# Patient Record
Sex: FEMALE | Race: WHITE
[De-identification: names, ages, dates, MRNs, and addresses within clinical notes are randomized per-mention and may not be internally consistent; named-entity substitution may affect disease eponyms.]

---

## 2018-06-09 ENCOUNTER — HOSPITAL ENCOUNTER (EMERGENCY)
Dept: HOSPITAL 76 - ED | Age: 48
Discharge: HOME | End: 2018-06-09
Payer: MEDICAID

## 2018-06-09 VITALS — SYSTOLIC BLOOD PRESSURE: 162 MMHG | DIASTOLIC BLOOD PRESSURE: 89 MMHG

## 2018-06-09 DIAGNOSIS — B02.9: Primary | ICD-10-CM

## 2018-06-09 PROCEDURE — 96372 THER/PROPH/DIAG INJ SC/IM: CPT

## 2018-06-09 PROCEDURE — 99283 EMERGENCY DEPT VISIT LOW MDM: CPT

## 2018-06-09 NOTE — ED PHYSICIAN DOCUMENTATION
PD HPI SKIN





- Stated complaint


Stated Complaint: RASH/UPPER RT SIDE PX





- Chief complaint


Chief Complaint: General





- History obtained from


History obtained from: Patient





- History of Present Illness


Timing - onset: How many days ago (2-3)


Timing - duration: Days (initially with some aching in right chest 2-3 days ago 

without obvious injury or reason. Started with some itching in right chest 

yesterday then rash last evening, increasing into today. Very painful. Feeling 

of general malaise as well.)


Timing - details: Gradual onset, Still present


Location: Chest (right chest in band at just below the breast level.)


Quality / character: Itchy, Painful, Discolored (red), Vesicular


Associated symptoms: Myalgias.  No: Fever


Contributing factors: No: Exposed to medication, Exposed to food, Insect bite /

sting


Similar symptoms before: Has not had sx before


Recently seen: Not recently seen





Review of Systems


Constitutional: reports: Chills, Myalgias, Fatigue.  denies: Fever


Nose: denies: Rhinorrhea / runny nose, Congestion


Throat: denies: Sore throat


Respiratory: denies: Cough


GI: denies: Abdominal Pain, Nausea, Vomiting, Diarrhea


: denies: Dysuria, Frequency


Skin: reports: Rash, Lesions


Neurologic: denies: Focal weakness, Numbness, Headache





PD PAST MEDICAL HISTORY





- Past Medical History


Past Medical History: No


Cardiovascular: None


Respiratory: None


Neuro: None


Endocrine/Autoimmune: None


GI: None


GYN: None


: None


HEENT: None


Psych: None


Musculoskeletal: None


Derm: None





- Past Surgical History


Past Surgical History: Yes





- Present Medications


Home Medications: 


 Ambulatory Orders











 Medication  Instructions  Recorded  Confirmed


 


Acyclovir 800 mg PO 5XD #35 tablet 06/09/18 


 


Amitriptyline [Elavil] 25 mg PO HS #30 tablet 06/09/18 


 


Dexamethasone [Decadron] 4 mg PO DAILY #5 tablet 06/09/18 


 


Oxycodone HCl/Acetaminophen 1 each PO Q6H PRN #25 tablet 06/09/18 





[Percocet 5-325 mg Tablet]   














- Allergies


Allergies/Adverse Reactions: 


 Allergies











Allergy/AdvReac Type Severity Reaction Status Date / Time


 


iron AdvReac  Anaphylaxis Verified 06/09/18 07:04














- Social History


Does the pt smoke?: No


Smoking Status: Never smoker


Does the pt drink ETOH?: Yes


Does the pt have substance abuse?: No





- Immunizations


Immunizations are current?: Yes





- POLST


Patient has POLST: No





PD ED PE NORMAL





- Vitals


Vital signs reviewed: Yes





- General


General: Alert and oriented X 3, No acute distress, Well developed/nourished





- Neck


Neck: Supple, no meningeal sign, No adenopathy





- Respiratory


Respiratory: No respiratory distress





- Derm


Derm: Warm and dry, Other (right chest with band of blistering red patches just 

below breast level to front sternum and around to the back, c/w shingles. )





- Extremities


Extremities: No deformity, No tenderness to palpate





- Neuro


Neuro: Alert and oriented X 3, No motor deficit, Normal speech





Results





- Vitals


Vitals: 


 Vital Signs - 24 hr











  06/09/18 06/09/18





  07:01 07:57


 


Temperature 36.7 C 


 


Heart Rate 75 74


 


Respiratory 18 16





Rate  


 


Blood Pressure 148/98 H 162/89 H


 


O2 Saturation 99 97








 Oxygen











O2 Source                      Room air

















PD MEDICAL DECISION MAKING





- ED course


Complexity details: considered differential, d/w patient





- Sepsis Event


Vital Signs: 


 Vital Signs - 24 hr











  06/09/18 06/09/18





  07:01 07:57


 


Temperature 36.7 C 


 


Heart Rate 75 74


 


Respiratory 18 16





Rate  


 


Blood Pressure 148/98 H 162/89 H


 


O2 Saturation 99 97








 Oxygen











O2 Source                      Room air

















Departure





- Departure


Disposition: 01 Home, Self Care


Clinical Impression: 


Shingles rash


Qualifiers:


 Herpes zoster complications: without complications Qualified Code(s): B02.9 - 

Zoster without complications





Condition: Stable


Record reviewed to determine appropriate education?: Yes


Instructions:  ED Shingles


Prescriptions: 


Acyclovir 800 mg PO 5XD #35 tablet


Amitriptyline [Elavil] 25 mg PO HS #30 tablet


Dexamethasone [Decadron] 4 mg PO DAILY #5 tablet


Oxycodone HCl/Acetaminophen [Percocet 5-325 mg Tablet] 1 each PO Q6H PRN #25 

tablet


 PRN Reason: Pain


Comments: 


Acyclovir to reduce the virus amount and shorten the course of this.  Decadron 

for inflammation of the nerve.  Elavil at night to decrease the nerve 

irritation and continue this for about a month.  Percocet if needed for pain.  

Drink lots of fluids and add a stool softener so you do not get constipated.  

This will likely take several days to week to dry up and then the pain can 

continue for several weeks after that.


Discharge Date/Time: 06/09/18 08:10